# Patient Record
Sex: MALE | Race: WHITE | NOT HISPANIC OR LATINO | Employment: FULL TIME | URBAN - NONMETROPOLITAN AREA
[De-identification: names, ages, dates, MRNs, and addresses within clinical notes are randomized per-mention and may not be internally consistent; named-entity substitution may affect disease eponyms.]

---

## 2023-01-25 ENCOUNTER — OFFICE VISIT (OUTPATIENT)
Dept: FAMILY MEDICINE | Facility: OTHER | Age: 28
End: 2023-01-25
Attending: NURSE PRACTITIONER
Payer: COMMERCIAL

## 2023-01-25 VITALS
BODY MASS INDEX: 29.55 KG/M2 | WEIGHT: 223 LBS | DIASTOLIC BLOOD PRESSURE: 88 MMHG | TEMPERATURE: 98.5 F | HEIGHT: 73 IN | RESPIRATION RATE: 16 BRPM | SYSTOLIC BLOOD PRESSURE: 134 MMHG | OXYGEN SATURATION: 98 % | HEART RATE: 70 BPM

## 2023-01-25 DIAGNOSIS — H72.92 OTITIS MEDIA, SEROUS, TM RUPTURE, LEFT: ICD-10-CM

## 2023-01-25 DIAGNOSIS — H91.92 HEARING DECREASED, LEFT: ICD-10-CM

## 2023-01-25 DIAGNOSIS — H93.12 TINNITUS, LEFT: Primary | ICD-10-CM

## 2023-01-25 DIAGNOSIS — H92.02 LEFT EAR PAIN: ICD-10-CM

## 2023-01-25 DIAGNOSIS — H65.92 OTITIS MEDIA, SEROUS, TM RUPTURE, LEFT: ICD-10-CM

## 2023-01-25 PROCEDURE — 99213 OFFICE O/P EST LOW 20 MIN: CPT | Performed by: NURSE PRACTITIONER

## 2023-01-25 RX ORDER — PSEUDOEPHEDRINE HCL 60 MG
30 TABLET ORAL EVERY 4 HOURS PRN
COMMUNITY

## 2023-01-25 ASSESSMENT — ENCOUNTER SYMPTOMS
RESPIRATORY NEGATIVE: 1
CARDIOVASCULAR NEGATIVE: 1
GASTROINTESTINAL NEGATIVE: 1
HEADACHES: 0
COUGH: 0
EYES NEGATIVE: 1
NEUROLOGICAL NEGATIVE: 1
MUSCULOSKELETAL NEGATIVE: 1
CONSTITUTIONAL NEGATIVE: 1

## 2023-01-25 ASSESSMENT — PAIN SCALES - GENERAL: PAINLEVEL: MODERATE PAIN (5)

## 2023-01-25 NOTE — PROGRESS NOTES
Hola Green  1995    ASSESSMENT/PLAN:   1. Tinnitus, left  2. Hearing decreased, left  3. Left ear pain  4. Otitis media, serous, tm rupture, left    Reviewed with patient that symptoms are consistent with left ear TM rupture with tinnitus secondary to recent viral infection/congestion.  Small TM perforation around 6:00 of left TM.  No redness, erythema, bulging or drainage noted.  Still having some left ear pressure/discomfort however it is improving.  No mastoid tenderness.  No cervical adenopathy.  Lungs are clear to auscultation, no wheezing.  Congestion has improved/resolved.  No maxillary or frontal tenderness.  Vital signs stable.  Patient has been afebrile.  Reviewed with patient that given there is no visible sign of infected left TM I would recommend monitoring symptoms.  We discussed etiology of tinnitus, and hopefully with use of over-the-counter nondrowsy histamine, ibuprofen and Tylenol TM rupture will heal and tinnitus will improve.  If symptoms were to worsen or persist I recommend he return for reevaluation.    Patient agrees with plan of care and verbalizes understating. AVS printed. Patient education provided verbally and written instructions provided as requested. Patient made aware of emergent sings and symptoms to monitor for and when to seek additional care/follow up.     SUBJECTIVE:   CHIEF COMPLAINT/ REASON FOR VISIT  Patient presents with:  Ear Problem: X 3 days  Sinus Problem: X 1 week     HISTORY OF PRESENT ILLNESS  Hola Green is a pleasant 27 year old male [facility] with concern of left ear ringing and pressure.  Patient states his left ear has been uncomfortable with pressure and pain since Monday.  The night he felt an intense amount of pressure and pain buildup however then felt a pop in his left ear.  He has now noticed a ringing in his ear since then.  He describes as constant.  He is having intermittent pressure and discomfort.  He feels like things  "are muffled and his hearing has decreased.  Prior to this he was congested with a slight cough for the past week and a half.  He had not noticed any ear discomfort until 2 days ago.  No fevers, chills or body aches throughout course of illness.    I have reviewed the nursing notes.  I have reviewed allergies, medication list, problem list, and past medical history.    REVIEW OF SYSTEMS  Review of Systems   Constitutional: Negative.    HENT: Positive for congestion and ear pain. Negative for ear discharge.    Eyes: Negative.    Respiratory: Negative.  Negative for cough.    Cardiovascular: Negative.    Gastrointestinal: Negative.    Genitourinary: Negative.    Musculoskeletal: Negative.    Neurological: Negative.  Negative for headaches.        VITAL SIGNS  Vitals:    01/25/23 1146   BP: 134/88   BP Location: Left arm   Patient Position: Sitting   Cuff Size: Adult Regular   Pulse: 70   Resp: 16   Temp: 98.5  F (36.9  C)   TempSrc: Temporal   SpO2: 98%   Weight: 101.2 kg (223 lb)   Height: 1.854 m (6' 1\")      Body mass index is 29.42 kg/m .    OBJECTIVE:   PHYSICAL EXAM  Physical Exam  Vitals reviewed.   Constitutional:       Appearance: Normal appearance. He is not ill-appearing or toxic-appearing.   HENT:      Head: Normocephalic and atraumatic.      Right Ear: Tympanic membrane, ear canal and external ear normal. No middle ear effusion. Tympanic membrane is not erythematous or bulging.      Left Ear: Ear canal and external ear normal. Decreased hearing noted. Tenderness present. No drainage. A middle ear effusion is present. Tympanic membrane is perforated. Tympanic membrane is not erythematous, retracted or bulging.      Ears:        Nose: Rhinorrhea present. No congestion.      Mouth/Throat:      Pharynx: Uvula midline. Posterior oropharyngeal erythema present. No oropharyngeal exudate.      Tonsils: No tonsillar exudate.   Eyes:      Conjunctiva/sclera: Conjunctivae normal.   Cardiovascular:      Rate and " Rhythm: Normal rate and regular rhythm.      Pulses: Normal pulses.      Heart sounds: Normal heart sounds.   Pulmonary:      Effort: Pulmonary effort is normal. No respiratory distress.      Breath sounds: Normal breath sounds. No wheezing.   Musculoskeletal:      Cervical back: Neck supple. No tenderness.   Lymphadenopathy:      Cervical: No cervical adenopathy.   Skin:     Findings: No rash.   Neurological:      General: No focal deficit present.      Mental Status: He is alert and oriented to person, place, and time.   Psychiatric:         Mood and Affect: Mood normal.         Behavior: Behavior normal.         Thought Content: Thought content normal.         Judgment: Judgment normal.        DIAGNOSTICS  No results found for any visits on 01/25/23.     Geraldine Carroll NP  [facility]

## 2023-01-25 NOTE — NURSING NOTE
Chief Complaint   Patient presents with     Ear Problem     X 3 days     Sinus Problem     X 1 week     Patient tx with sudafed.    Advanced Care Planning on file? no    Medication Review Completed: complete    FOOD SECURITY SCREENING QUESTIONS:    The next two questions are to help us understand your food security.  If you are feeling you need any assistance in this area, we have resources available to support you today.    Hunger Vital Signs:  Within the past 12 months we worried whether our food would run out before we got money to buy more. Never  Within the past 12 months the food we bought just didn't last and we didn't have money to get more. Never    Deanne Mclain LPN